# Patient Record
Sex: MALE | Race: WHITE
[De-identification: names, ages, dates, MRNs, and addresses within clinical notes are randomized per-mention and may not be internally consistent; named-entity substitution may affect disease eponyms.]

---

## 2022-06-10 PROBLEM — Z00.00 ENCOUNTER FOR PREVENTIVE HEALTH EXAMINATION: Status: ACTIVE | Noted: 2022-06-10

## 2022-06-15 ENCOUNTER — APPOINTMENT (OUTPATIENT)
Dept: ORTHOPEDIC SURGERY | Facility: CLINIC | Age: 82
End: 2022-06-15
Payer: MEDICARE

## 2022-06-15 DIAGNOSIS — Z86.39 PERSONAL HISTORY OF OTHER ENDOCRINE, NUTRITIONAL AND METABOLIC DISEASE: ICD-10-CM

## 2022-06-15 DIAGNOSIS — Z87.19 PERSONAL HISTORY OF OTHER DISEASES OF THE DIGESTIVE SYSTEM: ICD-10-CM

## 2022-06-15 DIAGNOSIS — M72.0 PALMAR FASCIAL FIBROMATOSIS [DUPUYTREN]: ICD-10-CM

## 2022-06-15 DIAGNOSIS — Z78.9 OTHER SPECIFIED HEALTH STATUS: ICD-10-CM

## 2022-06-15 PROCEDURE — 99214 OFFICE O/P EST MOD 30 MIN: CPT

## 2022-06-15 RX ORDER — LEVOTHYROXINE SODIUM 0.07 MG/1
75 TABLET ORAL
Qty: 90 | Refills: 0 | Status: ACTIVE | COMMUNITY
Start: 2022-04-28

## 2022-06-15 RX ORDER — ALFUZOSIN HYDROCHLORIDE 10 MG/1
10 TABLET, EXTENDED RELEASE ORAL
Qty: 90 | Refills: 0 | Status: ACTIVE | COMMUNITY
Start: 2021-12-28

## 2022-06-15 RX ORDER — FLUTICASONE PROPIONATE 50 UG/1
50 SPRAY, METERED NASAL
Qty: 16 | Refills: 0 | Status: ACTIVE | COMMUNITY
Start: 2022-04-28

## 2022-06-15 RX ORDER — ESZOPICLONE 2 MG/1
2 TABLET, COATED ORAL
Refills: 0 | Status: ACTIVE | COMMUNITY

## 2022-06-15 RX ORDER — BICALUTAMIDE 50 MG/1
50 TABLET ORAL
Qty: 90 | Refills: 0 | Status: ACTIVE | COMMUNITY
Start: 2021-12-28

## 2022-06-15 RX ORDER — DOXYCYCLINE HYCLATE 100 MG/1
100 CAPSULE ORAL
Qty: 14 | Refills: 0 | Status: ACTIVE | COMMUNITY
Start: 2022-04-28

## 2022-06-15 RX ORDER — SILODOSIN 8 MG/1
8 CAPSULE ORAL
Qty: 30 | Refills: 0 | Status: ACTIVE | COMMUNITY
Start: 2022-01-24

## 2022-06-15 RX ORDER — ATORVASTATIN CALCIUM 20 MG/1
20 TABLET, FILM COATED ORAL
Qty: 90 | Refills: 0 | Status: ACTIVE | COMMUNITY
Start: 2021-09-14

## 2022-06-15 RX ORDER — ATORVASTATIN CALCIUM 10 MG/1
10 TABLET, FILM COATED ORAL
Refills: 0 | Status: ACTIVE | COMMUNITY

## 2022-06-15 RX ORDER — FAMOTIDINE 10 MG/1
TABLET, FILM COATED ORAL
Refills: 0 | Status: ACTIVE | COMMUNITY

## 2022-06-15 RX ORDER — DICLOFENAC SODIUM 1% 10 MG/G
1 GEL TOPICAL 4 TIMES DAILY
Qty: 1 | Refills: 0 | Status: ACTIVE | COMMUNITY
Start: 2022-06-15 | End: 1900-01-01

## 2022-06-15 NOTE — IMAGING
[Bilateral] : hand bilaterally [There are no fractures, subluxations or dislocations. No significant abnormalities are seen] : There are no fractures, subluxations or dislocations. No significant abnormalities are seen

## 2022-06-15 NOTE — PHYSICAL EXAM
[Left] : left hand [Right] : right hand [NL (75)] : Dorsiflexion 75 degrees [NL (85)] : volarflexion 85 degrees [NL (25)] : radial deviation 25 degrees [NL (40)] : ulnar deviation 40 degrees [NL (90)] : supination 90 degrees [5___] : pinch 5[unfilled]/5 [] : no atrophy

## 2022-06-15 NOTE — HISTORY OF PRESENT ILLNESS
[de-identified] : RHD Patient presents today with pain in bilat hands/ thumbs \par Reports pain is intermittent\par Rpeorts having pain when "squeezing and flattening hands"\par Pt denies numbness or tingling in UEs  [] : no

## 2022-06-27 ENCOUNTER — APPOINTMENT (OUTPATIENT)
Dept: ORTHOPEDIC SURGERY | Facility: CLINIC | Age: 82
End: 2022-06-27

## 2022-12-28 ENCOUNTER — APPOINTMENT (OUTPATIENT)
Dept: ORTHOPEDIC SURGERY | Facility: CLINIC | Age: 82
End: 2022-12-28
Payer: MEDICARE

## 2022-12-28 PROCEDURE — 99214 OFFICE O/P EST MOD 30 MIN: CPT | Mod: 25

## 2022-12-28 PROCEDURE — 20605 DRAIN/INJ JOINT/BURSA W/O US: CPT | Mod: RT

## 2022-12-28 NOTE — HISTORY OF PRESENT ILLNESS
[de-identified] : 82M, RHD, PMHX of HLD, Thyroid Disease presents with bilateral thumb cmc pain since April 2022, without injury/trauma. L > R. 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 Torres and referred for definitive care. Admits to pain, difficulty with certain ROM.

## 2022-12-28 NOTE — IMAGING
[de-identified] : Right hand with no swelling, erythema. Able to make fist, oppose thumb to small finger with good thenar bulk, no intrinsic atrophy, - tenderness at radial styloid or A1 pulley. +grind. Stable thumb MCP with tenderness at thumb CMC. Sensation intact throughout. 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 \par \par Left hand with no swelling, erythema. Able to make fist, oppose thumb to small finger with good thenar bulk, no intrinsic atrophy, - tenderness at radial styloid or A1 pulley. +grind. Stable thumb MCP with tenderness at thumb CMC. Sensation intact throughout. <2sec cap refill.\par \par Left wrist radiographs demonstrate Stage III thumb arthritis.

## 2022-12-28 NOTE — ASSESSMENT
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 Discussed treatment ladder including NSAIDs, bracing (shown MetaGrip), hand therapy, CSI and basal joint arthroplasty. After discussion of risks/benefits, patient elected to proceed with CSI to bilateral thumb CMC.\par \par Procedure 2 - 0.5cc 1%lidocaine + 0.5cc 40mg/cc Kenalog administered to right thumb CMC joint following sterilization with Betadine. Patient tolerated this well.\par Procedure 1 - 0.5cc 1%lidocaine + 0.5cc 40mg/cc Kenalog administered to left thumb CMC joint following sterilization with Betadine. Patient tolerated this well.\par \par F/u 6mo

## 2023-06-12 ENCOUNTER — APPOINTMENT (OUTPATIENT)
Dept: ORTHOPEDIC SURGERY | Facility: CLINIC | Age: 83
End: 2023-06-12
Payer: MEDICARE

## 2023-06-12 VITALS — WEIGHT: 148 LBS | HEIGHT: 64 IN | BODY MASS INDEX: 25.27 KG/M2

## 2023-06-12 PROCEDURE — 20605 DRAIN/INJ JOINT/BURSA W/O US: CPT | Mod: 50

## 2023-06-12 PROCEDURE — 99214 OFFICE O/P EST MOD 30 MIN: CPT | Mod: 25

## 2023-06-12 NOTE — IMAGING
[de-identified] : Right hand with no swelling, erythema. Able to make fist, oppose thumb to small finger with good thenar bulk, no intrinsic atrophy, - tenderness at radial styloid or A1 pulley. +grind. Stable thumb MCP with tenderness at thumb CMC. Sensation intact throughout. 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 \par \par Left hand with no swelling, erythema. Able to make fist, oppose thumb to small finger with good thenar bulk, no intrinsic atrophy, - tenderness at radial styloid or A1 pulley. +grind. Stable thumb MCP with tenderness at thumb CMC. Sensation intact throughout. <2sec cap refill.\par \par Left wrist radiographs demonstrate Stage III thumb arthritis.

## 2023-10-09 ENCOUNTER — APPOINTMENT (OUTPATIENT)
Dept: ORTHOPEDIC SURGERY | Facility: CLINIC | Age: 83
End: 2023-10-09
Payer: MEDICARE

## 2023-10-09 VITALS — WEIGHT: 144 LBS | HEIGHT: 64 IN | BODY MASS INDEX: 24.59 KG/M2

## 2023-10-09 VITALS — BODY MASS INDEX: 25.27 KG/M2 | WEIGHT: 148 LBS | HEIGHT: 64 IN

## 2023-10-09 DIAGNOSIS — Z78.9 OTHER SPECIFIED HEALTH STATUS: ICD-10-CM

## 2023-10-09 PROCEDURE — 99214 OFFICE O/P EST MOD 30 MIN: CPT | Mod: 25

## 2023-10-09 PROCEDURE — 20605 DRAIN/INJ JOINT/BURSA W/O US: CPT | Mod: 50

## 2024-01-31 ENCOUNTER — APPOINTMENT (OUTPATIENT)
Dept: ORTHOPEDIC SURGERY | Facility: CLINIC | Age: 84
End: 2024-01-31
Payer: MEDICARE

## 2024-01-31 PROCEDURE — 20605 DRAIN/INJ JOINT/BURSA W/O US: CPT | Mod: 50

## 2024-01-31 PROCEDURE — 99214 OFFICE O/P EST MOD 30 MIN: CPT | Mod: 25

## 2024-01-31 NOTE — PROCEDURE
[Left] : of the left [Medium Joint Injection] : medium joint injection [Right] : of the right [CMC Joint] : CMC joint [Pain] : pain [Inflammation] : inflammation [X-ray evidence of Osteoarthritis on this or prior visit] : x-ray evidence of Osteoarthritis on this or prior visit [Betadine] : betadine [Ethyl Chloride sprayed topically] : ethyl chloride sprayed topically [Sterile technique used] : sterile technique used [___ cc    1%] : Lidocaine ~Vcc of 1%  [___ cc    40mg] : Triamcinolone (Kenalog) ~Vcc of 40 mg  [] : Patient tolerated procedure well [Call if redness, pain or fever occur] : call if redness, pain or fever occur [Apply ice for 15min out of every hour for the next 12-24 hours as tolerated] : apply ice for 15 minutes out of every hour for the next 12-24 hours as tolerated [Patient was advised to rest the joint(s) for ____ days] : patient was advised to rest the joint(s) for [unfilled] days [Previous OTC use and PT nontherapeutic] : patient has tried OTC's including aspirin, Ibuprofen, Aleve, etc or prescription NSAIDS, and/or exercises at home and/or physical therapy without satisfactory response [Patient had decreased mobility in the joint] : patient had decreased mobility in the joint [Risks, benefits, alternatives discussed / Verbal consent obtained] : the risks benefits, and alternatives have been discussed, and verbal consent was obtained

## 2024-01-31 NOTE — ASSESSMENT
[FreeTextEntry1] : 84 yo male presents today for evaluation of his bilateral thumbs. X-rays from June show bilateral CMC arthritis, L>R. Patient had CSI with Dr. Tomlinson in October with relief but pain has returned. Repeat injections today     - Recommend NSAIDs PRN  - Recommend rest, ice, compression, elevation - Recommend activity modification, avoid strenuous or high impact activities  Patient was educated on their diagnosis today. Emphasized the importance of gentle stretching and strengthening. Patient expressed understanding and all questions were answered today.    Follow up in 2 months

## 2024-01-31 NOTE — HISTORY OF PRESENT ILLNESS
[10] : 10 [6] : 6 [Constant] : constant [Leisure] : leisure [Sleep] : sleep [de-identified] : Patient is here today for B/L thumbs. States his thumbs have been bothering him for about 2 months, NKI. Reports having a CSI 3 months ago and had relief for 1 month. Admits to using Voltaren gel with little relief.  Denies numbness/tingling.  [] : no [FreeTextEntry1] : B/L thumbs [FreeTextEntry9] : N/A [de-identified] : movement

## 2024-05-29 ENCOUNTER — APPOINTMENT (OUTPATIENT)
Dept: ORTHOPEDIC SURGERY | Facility: CLINIC | Age: 84
End: 2024-05-29
Payer: MEDICARE

## 2024-05-29 VITALS — WEIGHT: 144 LBS | BODY MASS INDEX: 24.59 KG/M2 | HEIGHT: 64 IN

## 2024-05-29 DIAGNOSIS — M18.11 UNILATERAL PRIMARY OSTEOARTHRITIS OF FIRST CARPOMETACARPAL JOINT, RIGHT HAND: ICD-10-CM

## 2024-05-29 PROCEDURE — 99214 OFFICE O/P EST MOD 30 MIN: CPT | Mod: 25

## 2024-05-29 PROCEDURE — 20605 DRAIN/INJ JOINT/BURSA W/O US: CPT | Mod: 50

## 2024-05-29 NOTE — IMAGING
[de-identified] : Right hand with no swelling, erythema. Able to make fist, oppose thumb to small finger with good thenar bulk, no intrinsic atrophy, - tenderness at radial styloid or A1 pulley. +grind. Stable thumb MCP with tenderness at thumb CMC. Sensation intact throughout. 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 \par  \par  Left hand with no swelling, erythema. Able to make fist, oppose thumb to small finger with good thenar bulk, no intrinsic atrophy, - tenderness at radial styloid or A1 pulley. +grind. Stable thumb MCP with tenderness at thumb CMC. Sensation intact throughout. <2sec cap refill.\par  \par  Left wrist radiographs demonstrate Stage III thumb arthritis.

## 2024-05-29 NOTE — HISTORY OF PRESENT ILLNESS
[de-identified] : 82M, RHD, PMHX of HLD, Thyroid Disease presents with bilateral thumb cmc pain since April 2022, without injury/trauma. L > R. 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 Torres and referred for definitive care. Admits to pain, difficulty with certain ROM.   06/12/23: f/u bilateral thumb pain onset 1 month ago with NKI. Reports reduced ROM due to pain, such as buttoning his shirt. Patient states that he received CSI on 12/28/22 in bilateral thumb with relief for the past 6 months. Patient doing HEP with no relief. Denies numbness/tingling.   10/09/23: f/u bilateral thumb with pain since September 2023. Reports being unable to button his shirt due to his pain. Patient had CSI injections on 06/12/23, and reports it provided relief until September 2023. Reports reduced  strength due to his symptoms. Denies numbness/tingling.   05/29/24: f/u bilateral thumbs. Patient reports that he is having diffuse pain in his bilateral thumbs. Patient reports that he had CSI's on 10/09/23 and reports it provided relief until April 2024. Denies numbness/tingling.

## 2024-06-24 ENCOUNTER — APPOINTMENT (OUTPATIENT)
Dept: ORTHOPEDIC SURGERY | Facility: CLINIC | Age: 84
End: 2024-06-24
Payer: MEDICARE

## 2024-06-24 VITALS — HEIGHT: 64 IN | WEIGHT: 144 LBS | BODY MASS INDEX: 24.59 KG/M2

## 2024-06-24 DIAGNOSIS — M18.12 UNILATERAL PRIMARY OSTEOARTHRITIS OF FIRST CARPOMETACARPAL JOINT, LEFT HAND: ICD-10-CM

## 2024-06-24 PROCEDURE — 99213 OFFICE O/P EST LOW 20 MIN: CPT

## 2024-08-26 ENCOUNTER — APPOINTMENT (OUTPATIENT)
Dept: ORTHOPEDIC SURGERY | Facility: CLINIC | Age: 84
End: 2024-08-26
Payer: MEDICARE

## 2024-08-26 DIAGNOSIS — M18.11 UNILATERAL PRIMARY OSTEOARTHRITIS OF FIRST CARPOMETACARPAL JOINT, RIGHT HAND: ICD-10-CM

## 2024-08-26 DIAGNOSIS — M18.12 UNILATERAL PRIMARY OSTEOARTHRITIS OF FIRST CARPOMETACARPAL JOINT, LEFT HAND: ICD-10-CM

## 2024-08-26 PROCEDURE — 99213 OFFICE O/P EST LOW 20 MIN: CPT

## 2024-08-26 NOTE — HISTORY OF PRESENT ILLNESS
[de-identified] : 82M, RHD, PMHX of HLD, Thyroid Disease presents with bilateral thumb cmc pain since April 2022, without injury/trauma. L > R. 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 Torres and referred for definitive care. Admits to pain, difficulty with certain ROM.   06/12/23: f/u bilateral thumb pain onset 1 month ago with NKI. Reports reduced ROM due to pain, such as buttoning his shirt. Patient states that he received CSI on 12/28/22 in bilateral thumb with relief for the past 6 months. Patient doing HEP with no relief. Denies numbness/tingling.   10/09/23: f/u bilateral thumb with pain since September 2023. Reports being unable to button his shirt due to his pain. Patient had CSI injections on 06/12/23, and reports it provided relief until September 2023. Reports reduced  strength due to his symptoms. Denies numbness/tingling.   05/29/24: f/u bilateral thumbs. Patient reports that he is having diffuse pain in his bilateral thumbs. Patient reports that he had CSI's on 10/09/23 and reports it provided relief until April 2024. Denies numbness/tingling.   06/24/24: F/u bilateral thumbs. Patient reports that his left thumb is bothering him, stating that his left thumb is having a bad reaction. Patient reports a large area of erythema on the site of the CSI from 05/29/24, prompting his return. Denies numbness/tingling.   08/26/24: F/u bilateral thumbs. Patient reports that there is a significant increase in pain at this time, reports both are equally as painful. Patient reports that there is a significant loss of  strength that leads to decreased ability to complete ADLs. Last CSI, 5/29/24. Patient had Patient denies numbness and tingling. Patient has abrasion at the base of the RT thumb. Patient reports that the area around the CMC seems to be increasingly sensitive, bruises easily and also lacerates easily in this area.

## 2024-08-26 NOTE — ASSESSMENT
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 Discussed treatment ladder including NSAIDs, bracing (shown MetaGrip), hand therapy, CSI and basal joint arthroplasty. Hematoma at left CMC injection, observe, WBAT.  F/u 6mo (many skin tears)

## 2024-08-26 NOTE — IMAGING
[de-identified] : Right hand with no swelling, erythema. Able to make fist, oppose thumb to small finger with good thenar bulk, no intrinsic atrophy, - tenderness at radial styloid or A1 pulley. +grind. Stable thumb MCP with tenderness at thumb CMC. Sensation intact throughout. <2sec cap refill.  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   Left hand with no swelling, erythema. Able to make fist, oppose thumb to small finger with good thenar bulk, no intrinsic atrophy, - tenderness at radial styloid or A1 pulley. +grind. Stable thumb MCP with tenderness at thumb CMC. Sensation intact throughout. <2sec cap refill. Resolving hematoma at base of thumb. No erythema.  Left wrist radiographs demonstrate Stage III thumb arthritis.

## 2024-10-01 ENCOUNTER — APPOINTMENT (OUTPATIENT)
Dept: ORTHOPEDIC SURGERY | Facility: CLINIC | Age: 84
End: 2024-10-01

## 2024-10-01 DIAGNOSIS — M18.12 UNILATERAL PRIMARY OSTEOARTHRITIS OF FIRST CARPOMETACARPAL JOINT, LEFT HAND: ICD-10-CM

## 2024-10-01 DIAGNOSIS — M70.62 TROCHANTERIC BURSITIS, LEFT HIP: ICD-10-CM

## 2024-10-01 DIAGNOSIS — M18.11 UNILATERAL PRIMARY OSTEOARTHRITIS OF FIRST CARPOMETACARPAL JOINT, RIGHT HAND: ICD-10-CM

## 2024-10-01 PROCEDURE — 99214 OFFICE O/P EST MOD 30 MIN: CPT | Mod: 25

## 2024-10-01 PROCEDURE — 20605 DRAIN/INJ JOINT/BURSA W/O US: CPT | Mod: 50

## 2024-10-01 NOTE — IMAGING
[de-identified] : Right hand with no swelling, erythema. Able to make fist, oppose thumb to small finger with good thenar bulk, no intrinsic atrophy, - tenderness at radial styloid or A1 pulley. +grind. Stable thumb MCP with tenderness at thumb CMC. Sensation intact throughout. <2sec cap refill.  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   Left hand with no swelling, erythema. Able to make fist, oppose thumb to small finger with good thenar bulk, no intrinsic atrophy, - tenderness at radial styloid or A1 pulley. +grind. Stable thumb MCP with tenderness at thumb CMC. Sensation intact throughout. <2sec cap refill. Resolving hematoma at base of thumb. No erythema.  Left wrist radiographs demonstrate Stage III thumb arthritis.

## 2024-10-01 NOTE — IMAGING
[de-identified] : Right hand with no swelling, erythema. Able to make fist, oppose thumb to small finger with good thenar bulk, no intrinsic atrophy, - tenderness at radial styloid or A1 pulley. +grind. Stable thumb MCP with tenderness at thumb CMC. Sensation intact throughout. <2sec cap refill.  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   Left hand with no swelling, erythema. Able to make fist, oppose thumb to small finger with good thenar bulk, no intrinsic atrophy, - tenderness at radial styloid or A1 pulley. +grind. Stable thumb MCP with tenderness at thumb CMC. Sensation intact throughout. <2sec cap refill. Resolving hematoma at base of thumb. No erythema.  Left wrist radiographs demonstrate Stage III thumb arthritis.

## 2024-10-01 NOTE — HISTORY OF PRESENT ILLNESS
[de-identified] : 82M, RHD, PMHX of HLD, Thyroid Disease presents with bilateral thumb cmc pain since April 2022, without injury/trauma. L > R. 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 Torres and referred for definitive care. Admits to pain, difficulty with certain ROM.   06/12/23: f/u bilateral thumb pain onset 1 month ago with NKI. Reports reduced ROM due to pain, such as buttoning his shirt. Patient states that he received CSI on 12/28/22 in bilateral thumb with relief for the past 6 months. Patient doing HEP with no relief. Denies numbness/tingling.   10/09/23: f/u bilateral thumb with pain since September 2023. Reports being unable to button his shirt due to his pain. Patient had CSI injections on 06/12/23, and reports it provided relief until September 2023. Reports reduced  strength due to his symptoms. Denies numbness/tingling.   05/29/24: f/u bilateral thumbs. Patient reports that he is having diffuse pain in his bilateral thumbs. Patient reports that he had CSI's on 10/09/23 and reports it provided relief until April 2024. Denies numbness/tingling.   06/24/24: F/u bilateral thumbs. Patient reports that his left thumb is bothering him, stating that his left thumb is having a bad reaction. Patient reports a large area of erythema on the site of the CSI from 05/29/24, prompting his return. Denies numbness/tingling.   08/26/24: F/u bilateral thumbs. Patient reports that there is a significant increase in pain at this time, reports both are equally as painful. Patient reports that there is a significant loss of  strength that leads to decreased ability to complete ADLs. Last CSI,   10/1/24: Patient presents today for left hand pain that has worsens within the last 3-4 days, taking Tylenol with some relief. Continues to experience pain in bilateral thumbs, received CSI last visit that helped for a short time period.

## 2024-10-01 NOTE — ASSESSMENT
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 Discussed treatment ladder including NSAIDs, bracing (shown MetaGrip), hand therapy, CSI and basal joint arthroplasty.  After discussion of risks/benefits, including glucose intolerance in the diabetic population, patient elected to proceed with CSI to the thumb CMC joint.  Procedure 1 - 0.5cc 1% lidocaine + 0.5cc 40mg/cc Kenalog administered to the right thumb CMC joint following sterilization with Betadine. Patient tolerated this well. Procedure 2 - 0.5cc 1% lidocaine + 0.5cc 40mg/cc Kenalog administered to the left thumb CMC joint following sterilization with Betadine. Patient tolerated this well.  F/u 6mo (many skin tears)

## 2024-10-01 NOTE — HISTORY OF PRESENT ILLNESS
[de-identified] : 82M, RHD, PMHX of HLD, Thyroid Disease presents with bilateral thumb cmc pain since April 2022, without injury/trauma. L > R. 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 Torres and referred for definitive care. Admits to pain, difficulty with certain ROM.   06/12/23: f/u bilateral thumb pain onset 1 month ago with NKI. Reports reduced ROM due to pain, such as buttoning his shirt. Patient states that he received CSI on 12/28/22 in bilateral thumb with relief for the past 6 months. Patient doing HEP with no relief. Denies numbness/tingling.   10/09/23: f/u bilateral thumb with pain since September 2023. Reports being unable to button his shirt due to his pain. Patient had CSI injections on 06/12/23, and reports it provided relief until September 2023. Reports reduced  strength due to his symptoms. Denies numbness/tingling.   05/29/24: f/u bilateral thumbs. Patient reports that he is having diffuse pain in his bilateral thumbs. Patient reports that he had CSI's on 10/09/23 and reports it provided relief until April 2024. Denies numbness/tingling.   06/24/24: F/u bilateral thumbs. Patient reports that his left thumb is bothering him, stating that his left thumb is having a bad reaction. Patient reports a large area of erythema on the site of the CSI from 05/29/24, prompting his return. Denies numbness/tingling.   08/26/24: F/u bilateral thumbs. Patient reports that there is a significant increase in pain at this time, reports both are equally as painful. Patient reports that there is a significant loss of  strength that leads to decreased ability to complete ADLs. Last CSI,   10/1/24: Patient presents today for left hand pain that has worsens within the last 3-4 days, taking Tylenol with some relief. Continues to experience pain in bilateral thumbs, received CSI last visit that helped for a short time period.

## 2024-12-09 ENCOUNTER — APPOINTMENT (OUTPATIENT)
Dept: ORTHOPEDIC SURGERY | Facility: CLINIC | Age: 84
End: 2024-12-09
Payer: MEDICARE

## 2024-12-09 VITALS — BODY MASS INDEX: 24.59 KG/M2 | HEIGHT: 64 IN | WEIGHT: 144 LBS

## 2024-12-09 DIAGNOSIS — M18.11 UNILATERAL PRIMARY OSTEOARTHRITIS OF FIRST CARPOMETACARPAL JOINT, RIGHT HAND: ICD-10-CM

## 2024-12-09 DIAGNOSIS — M18.12 UNILATERAL PRIMARY OSTEOARTHRITIS OF FIRST CARPOMETACARPAL JOINT, LEFT HAND: ICD-10-CM

## 2024-12-09 PROCEDURE — 99213 OFFICE O/P EST LOW 20 MIN: CPT

## 2024-12-24 ENCOUNTER — APPOINTMENT (OUTPATIENT)
Dept: ORTHOPEDIC SURGERY | Facility: CLINIC | Age: 84
End: 2024-12-24
Payer: MEDICARE

## 2024-12-24 VITALS — HEIGHT: 64 IN | WEIGHT: 144 LBS | BODY MASS INDEX: 24.59 KG/M2

## 2024-12-24 DIAGNOSIS — M70.62 TROCHANTERIC BURSITIS, LEFT HIP: ICD-10-CM

## 2024-12-24 PROCEDURE — 20610 DRAIN/INJ JOINT/BURSA W/O US: CPT | Mod: LT

## 2024-12-24 PROCEDURE — 73503 X-RAY EXAM HIP UNI 4/> VIEWS: CPT | Mod: LT

## 2024-12-24 PROCEDURE — 99214 OFFICE O/P EST MOD 30 MIN: CPT | Mod: 25

## 2024-12-24 RX ORDER — ASPIRIN 81 MG
81 TABLET,CHEWABLE ORAL
Refills: 0 | Status: ACTIVE | COMMUNITY

## 2025-03-04 ENCOUNTER — APPOINTMENT (OUTPATIENT)
Dept: ORTHOPEDIC SURGERY | Facility: CLINIC | Age: 85
End: 2025-03-04
Payer: MEDICARE

## 2025-03-04 VITALS — WEIGHT: 144 LBS | BODY MASS INDEX: 24.59 KG/M2 | HEIGHT: 64 IN

## 2025-03-04 DIAGNOSIS — M70.62 TROCHANTERIC BURSITIS, LEFT HIP: ICD-10-CM

## 2025-03-04 PROCEDURE — 99213 OFFICE O/P EST LOW 20 MIN: CPT

## 2025-04-14 ENCOUNTER — APPOINTMENT (OUTPATIENT)
Dept: ORTHOPEDIC SURGERY | Facility: CLINIC | Age: 85
End: 2025-04-14
Payer: MEDICARE

## 2025-04-14 DIAGNOSIS — M18.11 UNILATERAL PRIMARY OSTEOARTHRITIS OF FIRST CARPOMETACARPAL JOINT, RIGHT HAND: ICD-10-CM

## 2025-04-14 DIAGNOSIS — M18.12 UNILATERAL PRIMARY OSTEOARTHRITIS OF FIRST CARPOMETACARPAL JOINT, LEFT HAND: ICD-10-CM

## 2025-04-14 PROCEDURE — 99214 OFFICE O/P EST MOD 30 MIN: CPT | Mod: 25

## 2025-04-14 PROCEDURE — 20605 DRAIN/INJ JOINT/BURSA W/O US: CPT | Mod: 50

## 2025-06-17 ENCOUNTER — APPOINTMENT (OUTPATIENT)
Dept: ORTHOPEDIC SURGERY | Facility: CLINIC | Age: 85
End: 2025-06-17
Payer: MEDICARE

## 2025-06-17 VITALS — HEIGHT: 64 IN | BODY MASS INDEX: 24.59 KG/M2 | WEIGHT: 144 LBS

## 2025-06-17 PROCEDURE — 99214 OFFICE O/P EST MOD 30 MIN: CPT | Mod: 25

## 2025-06-17 PROCEDURE — 20610 DRAIN/INJ JOINT/BURSA W/O US: CPT | Mod: LT

## 2025-09-11 ENCOUNTER — APPOINTMENT (OUTPATIENT)
Dept: ORTHOPEDIC SURGERY | Facility: CLINIC | Age: 85
End: 2025-09-11
Payer: MEDICARE

## 2025-09-11 DIAGNOSIS — M70.62 TROCHANTERIC BURSITIS, LEFT HIP: ICD-10-CM

## 2025-09-11 PROCEDURE — 99213 OFFICE O/P EST LOW 20 MIN: CPT
